# Patient Record
Sex: MALE | Race: BLACK OR AFRICAN AMERICAN | NOT HISPANIC OR LATINO | Employment: OTHER | URBAN - METROPOLITAN AREA
[De-identification: names, ages, dates, MRNs, and addresses within clinical notes are randomized per-mention and may not be internally consistent; named-entity substitution may affect disease eponyms.]

---

## 2019-12-29 ENCOUNTER — HOSPITAL ENCOUNTER (EMERGENCY)
Facility: HOSPITAL | Age: 39
Discharge: HOME | End: 2019-12-29
Payer: COMMERCIAL

## 2019-12-29 VITALS
RESPIRATION RATE: 20 BRPM | HEART RATE: 44 BPM | SYSTOLIC BLOOD PRESSURE: 128 MMHG | BODY MASS INDEX: 25.77 KG/M2 | TEMPERATURE: 98 F | HEIGHT: 70 IN | WEIGHT: 180 LBS | DIASTOLIC BLOOD PRESSURE: 74 MMHG | OXYGEN SATURATION: 100 %

## 2019-12-29 DIAGNOSIS — N50.812 LEFT TESTICULAR PAIN: Primary | ICD-10-CM

## 2019-12-29 LAB
BILIRUB UR QL STRIP: NEGATIVE
CLARITY UR: CLEAR
COLOR UR: YELLOW
GLUCOSE UR QL STRIP: NEGATIVE
HGB UR QL STRIP: NEGATIVE
KETONES UR QL STRIP: NEGATIVE
LEUKOCYTE ESTERASE UR QL STRIP: NEGATIVE
NITRITE UR QL STRIP: NEGATIVE
PH UR STRIP: 7 [PH] (ref 5–8)
PROT UR QL STRIP: NEGATIVE
SP GR UR STRIP: 1.01 (ref 1–1.03)
URN SPEC COLLECT METH UR: NORMAL
UROBILINOGEN UR STRIP-ACNC: NEGATIVE EU/DL

## 2019-12-29 PROCEDURE — 99284 EMERGENCY DEPT VISIT MOD MDM: CPT | Mod: 25

## 2019-12-29 PROCEDURE — 81003 URINALYSIS AUTO W/O SCOPE: CPT

## 2019-12-29 PROCEDURE — 87491 CHLMYD TRACH DNA AMP PROBE: CPT

## 2019-12-29 RX ORDER — NAPROXEN 500 MG/1
500 TABLET ORAL
COMMUNITY
End: 2020-02-19 | Stop reason: SDUPTHER

## 2019-12-29 NOTE — ED PROVIDER NOTES
Encounter Date: 12/29/2019    SCRIBE #1 NOTE: I, Leona Norman, am scribing for, and in the presence of,  Haley Estrada PA-C . I have scribed the following portions of the note - Other sections scribed: HPI, ROS.       History     Chief Complaint   Patient presents with    Groin Swelling     pt has left testical pain and swelling. was sent from  for further eval with ultrasound.      CC: Testicular pain    HPI:  This is a 39 y.o. male with no pertinent PMHx who presents to the Emergency Department complaining of left testicular pain that began this morning after the pt unloaded his truck. Pt states that the left testicle was swollen this morning but improved after taking Naproxen. Pt reports an associated symptom of suprapubic/pelvic pain. He denies rash, penile pain, penile discharge, dysuria, abdominal pain, nausea, vomiting, or diarrhea, or constipation. Pain worsens with palpation. Pt did not express concern about STD. Pt has a hx of UTI. No PSHx.    The history is provided by the patient.     Review of patient's allergies indicates:  No Known Allergies  History reviewed. No pertinent past medical history.  History reviewed. No pertinent surgical history.  History reviewed. No pertinent family history.  Social History     Tobacco Use    Smoking status: Never Smoker   Substance Use Topics    Alcohol use: Yes     Frequency: Never     Comment: occ    Drug use: Never     Review of Systems   Constitutional: Negative for fever.   HENT: Negative for sore throat.    Respiratory: Negative for shortness of breath.    Cardiovascular: Negative for chest pain.   Gastrointestinal: Negative for abdominal pain, constipation, diarrhea, nausea and vomiting.   Genitourinary: Positive for testicular pain. Negative for discharge, dysuria, hematuria and penile pain.        Positive for pelvic pain   Musculoskeletal: Negative for back pain.   Skin: Negative for rash.   Neurological: Negative for weakness.   Hematological: Does not  bruise/bleed easily.       Physical Exam     Initial Vitals [12/29/19 1232]   BP Pulse Resp Temp SpO2   (!) 140/84 60 18 98.2 °F (36.8 °C) 100 %      MAP       --         Physical Exam    Constitutional: He appears well-developed and well-nourished. He is not diaphoretic. No distress.   HENT:   Head: Normocephalic and atraumatic.   Mouth/Throat: Oropharynx is clear and moist. No oropharyngeal exudate.   Eyes: Conjunctivae and EOM are normal. Pupils are equal, round, and reactive to light.   Neck: Normal range of motion. Neck supple.   Cardiovascular: Normal rate, regular rhythm, normal heart sounds and intact distal pulses.   Pulmonary/Chest: Breath sounds normal. No respiratory distress. He has no wheezes. He has no rales.   Abdominal: Soft. Bowel sounds are normal. There is no tenderness. There is no rebound and no guarding.   Genitourinary: Penis normal. No discharge found.   Genitourinary Comments:  exam chaperoned by RN. Normal penis. Normal scrotum. Mildly tender left testicle without erythema or swelling. No rashes noted. Cremasteric reflex intact. No inguinal hernias palpated.    Musculoskeletal: He exhibits no edema.   Lymphadenopathy:     He has no cervical adenopathy.   Neurological: He is alert.   Skin: Skin is warm and dry.   Psychiatric: He has a normal mood and affect.         ED Course   Procedures  Labs Reviewed   C. TRACHOMATIS/N. GONORRHOEAE BY AMP DNA   URINALYSIS, REFLEX TO URINE CULTURE    Narrative:     Preferred Collection Type->Urine, Clean Catch          Imaging Results          US Scrotum And Testicles (Final result)  Result time 12/29/19 15:10:43    Final result by Tito Wylie MD (12/29/19 15:10:43)                 Impression:      No significant abnormality.      Electronically signed by: Tito Wylie MD  Date:    12/29/2019  Time:    15:10             Narrative:    EXAMINATION:  US SCROTUM AND TESTICLES    CLINICAL HISTORY:  Left testicular pain    TECHNIQUE:  Sonography of  the scrotum and testes.    COMPARISON:  None.    FINDINGS:  Right Testicle:    *Size: 4.2 x 2.9 x 3.2 cm  *Appearance: Normal.  *Flow: Normal arterial and venous flow  *Epididymis: Normal.  *Hydrocele: None.  *Varicocele: None.  .    Left Testicle:    *Size: 4.0 x 2.4 x 2.9 cm  *Appearance: Normal.  *Flow: Normal arterial and venous flow  *Epididymis: Normal.  *Hydrocele: None.  *Varicocele: None.  .    Other findings: None.                                       APC / Resident Notes:   Patient is a 39-year-old  male with no past medical history presents to the ED for urgent evaluation of left testicular pain. Patient reports pain began 1 week ago after loading a heavy object into his truck.  He states pain subsided and then returned today after unloading a different heavy object for work.    PE reveals a nontoxic, afebrile, well-appearing male in no acute distress.  Vital signs are stable. Heart RRR.  Lungs CTAB.  Abdomen soft and nontender.   exam unremarkable. There is no testicular swelling or erythema.  Mild tenderness noted to the left testicle.  No hernia is noted.    Will obtain UA, GC culture, and scrotal ultrasound.  Patient offered NSAID, but declined stating that his pain is tolerable at this time after taking naproxen at home.    Differential diagnosis includes but is not limited to:  Testicular torsion, epididymitis, inguinal hernia, UTI, STD, testicular trauma.    UA without evidence of infection.  Ultrasound without significant abnormality.    Upon reassessment, patient states pain remains at a tolerable level.  Unclear cause of testicular pain at this time, though I do not suspect testicular torsion, epididymitis, or UTI this time.  GC pending, although low suspicion for STD.  He was advised to continue naproxen as needed for pain relief as well as ice compresses.  He was given contact information for Urology if symptoms persist.  ED return precautions given.  Patient verbalized  understanding and is agreeable.       Scribe Attestation:   Scribe #1: I performed the above scribed service and the documentation accurately describes the services I performed. I attest to the accuracy of the note.                          Clinical Impression:       ICD-10-CM ICD-9-CM   1. Left testicular pain N50.812 608.9         Disposition:   Disposition: Discharged  Condition: Stable    Scribe attestation: I, Haley Awad, personally performed the services described in this documentation. All medical record entries made by the scribe were at my direction and in my presence.  I have reviewed the chart and agree that the record reflects my personal performance and is accurate and complete.                 Haley Awad PA-C  12/29/19 5380

## 2019-12-29 NOTE — ED TRIAGE NOTES
Pt c/o LEFT testicle pain/swelling, aching pelvic pain. Denies trauma. Symptoms started last weekend, went away then came back this AM after heavy lifting. Denies penile discharge, dysuria, N/V/D. Pain is 8/10. Pt took naproxen at 1100

## 2019-12-29 NOTE — ED NOTES
Informed provider that patient's pulse is 44 currently, no signs or symptoms of low pulse.  Patient states he is an avid runner and does a lot of cardio.  Provider states the low pulse is normal for active people.

## 2019-12-29 NOTE — DISCHARGE INSTRUCTIONS
To relieve pain and swelling, apply an ice pack wrapped in a thin towel for 10 minutes at a time. Continue this on and off for 1 to 2 days.  Continue Naproxen as directed as needed for pain relief.  Follow-up with Urology if symptoms do not improve.  Return to the ED for any concerning symptoms.    Our goal in the emergency department is to always give you outstanding care and exceptional service. You may receive a survey by mail or e-mail in the next week regarding your experience in our ED. We would greatly appreciate your completing and returning the survey. Your feedback provides us with a way to recognize our staff who give very good care and it helps us learn how to improve when your experience was below our aspiration of excellence.

## 2019-12-30 LAB
C TRACH DNA SPEC QL NAA+PROBE: NOT DETECTED
N GONORRHOEA DNA SPEC QL NAA+PROBE: NOT DETECTED

## 2020-02-19 ENCOUNTER — OFFICE VISIT (OUTPATIENT)
Dept: UROLOGY | Facility: CLINIC | Age: 40
End: 2020-02-19
Payer: COMMERCIAL

## 2020-02-19 ENCOUNTER — LAB VISIT (OUTPATIENT)
Dept: LAB | Facility: HOSPITAL | Age: 40
End: 2020-02-19
Attending: UROLOGY
Payer: COMMERCIAL

## 2020-02-19 VITALS — BODY MASS INDEX: 26.33 KG/M2 | WEIGHT: 183.88 LBS | HEIGHT: 70 IN

## 2020-02-19 DIAGNOSIS — N40.0 BPH WITHOUT OBSTRUCTION/LOWER URINARY TRACT SYMPTOMS: ICD-10-CM

## 2020-02-19 DIAGNOSIS — R10.9 FLANK PAIN: ICD-10-CM

## 2020-02-19 DIAGNOSIS — N50.819 ORCHALGIA: Primary | ICD-10-CM

## 2020-02-19 DIAGNOSIS — R10.2 PELVIC PAIN: ICD-10-CM

## 2020-02-19 LAB
ANION GAP SERPL CALC-SCNC: 4 MMOL/L (ref 8–16)
BILIRUB SERPL-MCNC: NORMAL MG/DL
BLOOD URINE, POC: NORMAL
BUN SERPL-MCNC: 15 MG/DL (ref 6–20)
CALCIUM SERPL-MCNC: 9.6 MG/DL (ref 8.7–10.5)
CHLORIDE SERPL-SCNC: 104 MMOL/L (ref 95–110)
CO2 SERPL-SCNC: 31 MMOL/L (ref 23–29)
COLOR, POC UA: YELLOW
CREAT SERPL-MCNC: 1.3 MG/DL (ref 0.5–1.4)
EST. GFR  (AFRICAN AMERICAN): >60 ML/MIN/1.73 M^2
EST. GFR  (NON AFRICAN AMERICAN): >60 ML/MIN/1.73 M^2
GLUCOSE SERPL-MCNC: 89 MG/DL (ref 70–110)
GLUCOSE UR QL STRIP: NORMAL
KETONES UR QL STRIP: NORMAL
LEUKOCYTE ESTERASE URINE, POC: NORMAL
NITRITE, POC UA: NORMAL
PH, POC UA: 6
POTASSIUM SERPL-SCNC: 4.5 MMOL/L (ref 3.5–5.1)
PROTEIN, POC: NORMAL
SODIUM SERPL-SCNC: 139 MMOL/L (ref 136–145)
SPECIFIC GRAVITY, POC UA: 1000
UROBILINOGEN, POC UA: NORMAL

## 2020-02-19 PROCEDURE — 3008F BODY MASS INDEX DOCD: CPT | Mod: CPTII,S$GLB,, | Performed by: UROLOGY

## 2020-02-19 PROCEDURE — 99999 PR PBB SHADOW E&M-EST. PATIENT-LVL III: CPT | Mod: PBBFAC,,, | Performed by: UROLOGY

## 2020-02-19 PROCEDURE — 99999 PR PBB SHADOW E&M-EST. PATIENT-LVL III: ICD-10-PCS | Mod: PBBFAC,,, | Performed by: UROLOGY

## 2020-02-19 PROCEDURE — 84153 ASSAY OF PSA TOTAL: CPT

## 2020-02-19 PROCEDURE — 3008F PR BODY MASS INDEX (BMI) DOCUMENTED: ICD-10-PCS | Mod: CPTII,S$GLB,, | Performed by: UROLOGY

## 2020-02-19 PROCEDURE — 36415 COLL VENOUS BLD VENIPUNCTURE: CPT

## 2020-02-19 PROCEDURE — 87086 URINE CULTURE/COLONY COUNT: CPT

## 2020-02-19 PROCEDURE — 81001 POCT URINALYSIS, DIPSTICK OR TABLET REAGENT, AUTOMATED, WITH MICROSCOP: ICD-10-PCS | Mod: S$GLB,,, | Performed by: UROLOGY

## 2020-02-19 PROCEDURE — 99204 OFFICE O/P NEW MOD 45 MIN: CPT | Mod: 25,S$GLB,, | Performed by: UROLOGY

## 2020-02-19 PROCEDURE — 99204 PR OFFICE/OUTPT VISIT, NEW, LEVL IV, 45-59 MIN: ICD-10-PCS | Mod: 25,S$GLB,, | Performed by: UROLOGY

## 2020-02-19 PROCEDURE — 80048 BASIC METABOLIC PNL TOTAL CA: CPT

## 2020-02-19 PROCEDURE — 81001 URINALYSIS AUTO W/SCOPE: CPT | Mod: S$GLB,,, | Performed by: UROLOGY

## 2020-02-19 RX ORDER — NAPROXEN 500 MG/1
500 TABLET ORAL 2 TIMES DAILY WITH MEALS
Qty: 60 TABLET | Refills: 1 | Status: SHIPPED | OUTPATIENT
Start: 2020-02-19 | End: 2021-03-02

## 2020-02-19 RX ORDER — TAMSULOSIN HYDROCHLORIDE 0.4 MG/1
0.4 CAPSULE ORAL DAILY
Qty: 30 CAPSULE | Refills: 11 | Status: SHIPPED | OUTPATIENT
Start: 2020-02-19 | End: 2020-03-20

## 2020-02-19 RX ORDER — CYCLOBENZAPRINE HCL 10 MG
10 TABLET ORAL
COMMUNITY
Start: 2020-02-12 | End: 2020-02-22

## 2020-02-19 NOTE — PROGRESS NOTES
Subjective:       Patient ID: Berry Anne is a 39 y.o. male who was referred by Self, Aaareferral    Chief Complaint:   Chief Complaint   Patient presents with    Groin Pain     new pt coming in for rt groin pain an ultrasound was done and pt states he sometimes have urinary frequency    Urinary Frequency       Orchalgia  He has had intermittent orchalgia.  This started in December 2019 on the left.  His most recent episode was two days ago on the right.  He denies trauma or infection.  He denies dysuria, hematuria, or urethral discharge.  He denies lifting any thing abnormal.  He cannot recall an inciting events. His event two days ago started when he was getting out of the shower.  He can take Aleeve with eventual resolution of his symptoms.  He has also noted frequency, pelvic and flank discomfort when he has these episodes.    Benign Prostatic Hyperplasia  He patient reports frequency and nocturia one time a night. He denies straining and weak stream. The patient states symptoms are of moderate severity. Onset of symptoms was several months ago and was gradual in onset.  He has no personal history and no family history of prostate cancer. He reports a history of no complicating symptoms. He denies flank pain, gross hematuria, kidney stones and recurrent UTI.  He is currently taking no prostate medications.        ACTIVE MEDICAL ISSUES:  There is no problem list on file for this patient.      PAST MEDICAL HISTORY  History reviewed. No pertinent past medical history.    PAST SURGICAL HISTORY:  History reviewed. No pertinent surgical history.    SOCIAL HISTORY:  Social History     Tobacco Use    Smoking status: Never Smoker    Smokeless tobacco: Never Used   Substance Use Topics    Alcohol use: Yes     Frequency: Never     Comment: occ    Drug use: Never       FAMILY HISTORY:  Family History   Problem Relation Age of Onset    Diabetes Father     Diabetes Mother        ALLERGIES AND MEDICATIONS: updated  "and reviewed.  Review of patient's allergies indicates:  No Known Allergies  Current Outpatient Medications   Medication Sig    cyclobenzaprine (FLEXERIL) 10 MG tablet Take 10 mg by mouth.    naproxen (NAPROSYN) 500 MG tablet Take 1 tablet (500 mg total) by mouth 2 (two) times daily with meals.    tamsulosin (FLOMAX) 0.4 mg Cap Take 1 capsule (0.4 mg total) by mouth once daily.     No current facility-administered medications for this visit.        Review of Systems   Constitutional: Negative for activity change, fatigue, fever and unexpected weight change.   HENT: Negative for congestion.    Eyes: Negative for redness.   Respiratory: Negative for chest tightness and shortness of breath.    Cardiovascular: Negative for chest pain and leg swelling.   Gastrointestinal: Negative for abdominal pain, constipation, diarrhea, nausea and vomiting.   Genitourinary: Positive for testicular pain. Negative for dysuria, flank pain, frequency, hematuria, penile pain, penile swelling, scrotal swelling and urgency.   Musculoskeletal: Negative for arthralgias and back pain.   Neurological: Negative for dizziness and light-headedness.   Psychiatric/Behavioral: Negative for behavioral problems and confusion. The patient is not nervous/anxious.    All other systems reviewed and are negative.      Objective:      Vitals:    02/19/20 1543   Weight: 83.4 kg (183 lb 13.8 oz)   Height: 5' 10" (1.778 m)     Physical Exam   Nursing note and vitals reviewed.  Constitutional: He is oriented to person, place, and time. He appears well-developed and well-nourished.   HENT:   Head: Normocephalic.   Eyes: Conjunctivae are normal.   Neck: Normal range of motion. Neck supple. No tracheal deviation present. No thyromegaly present.   Cardiovascular: Normal rate and normal heart sounds.    Pulmonary/Chest: Effort normal and breath sounds normal. No respiratory distress. He has no wheezes.   Abdominal: Soft. Bowel sounds are normal. There is no " hepatosplenomegaly. There is no tenderness. There is no rebound and no CVA tenderness. No hernia. Hernia confirmed negative in the right inguinal area and confirmed negative in the left inguinal area.   Genitourinary: Testes normal and penis normal. Right testis shows no mass, no swelling and no tenderness. Cremasteric reflex is not absent on the right side. Left testis shows no mass, no swelling and no tenderness. Cremasteric reflex is not absent on the left side. Circumcised.   Musculoskeletal: Normal range of motion. He exhibits no edema or tenderness.   Lymphadenopathy:     He has no cervical adenopathy. No inguinal adenopathy noted on the right or left side.   Neurological: He is alert and oriented to person, place, and time.   Skin: Skin is warm and dry. No rash noted. No erythema.     Psychiatric: He has a normal mood and affect. His behavior is normal. Judgment and thought content normal.       Urine dipstick shows negative for all components.  Micro exam: negative for WBC's or RBC's.    US Scrotum And Testicles   Order: [id number]   Status:  Final result   Visible to patient:  No (Not Released)   Next appt:  None   Dx:  Left testicular pain   Details     Reading Physician Reading Date Result Priority   Tito Wylie MD 12/29/2019 STAT      Narrative     EXAMINATION:  US SCROTUM AND TESTICLES    CLINICAL HISTORY:  Left testicular pain    TECHNIQUE:  Sonography of the scrotum and testes.    COMPARISON:  None.    FINDINGS:  Right Testicle:    *Size: 4.2 x 2.9 x 3.2 cm  *Appearance: Normal.  *Flow: Normal arterial and venous flow  *Epididymis: Normal.  *Hydrocele: None.  *Varicocele: None.  .    Left Testicle:    *Size: 4.0 x 2.4 x 2.9 cm  *Appearance: Normal.  *Flow: Normal arterial and venous flow  *Epididymis: Normal.  *Hydrocele: None.  *Varicocele: None.  .    Other findings: None.      Impression       No significant abnormality.      Electronically signed by: Tito Wylie MD  Date: 12/29/2019  Time:  15:10            Last Resulted: 12/29/19 15:10               Assessment:       1. Orchalgia    2. BPH without obstruction/lower urinary tract symptoms    3. Pelvic pain    4. Flank pain          Plan:       1. Orchalgia  I asked him to call the office when he is having pain.  I would like to do an exam while he is having pain.    - POCT urinalysis, dipstick or tablet reag  - Urine culture  - CT Renal Stone Study ABD Pelvis WO; Future  - naproxen (NAPROSYN) 500 MG tablet; Take 1 tablet (500 mg total) by mouth 2 (two) times daily with meals.  Dispense: 60 tablet; Refill: 1    2. BPH without obstruction/lower urinary tract symptoms    - tamsulosin (FLOMAX) 0.4 mg Cap; Take 1 capsule (0.4 mg total) by mouth once daily.  Dispense: 30 capsule; Refill: 11  - Prostate Specific Antigen, Diagnostic; Future  - Basic metabolic panel; Future    3. Pelvic pain    - CT Renal Stone Study ABD Pelvis WO; Future    4. Flank pain    - CT Renal Stone Study ABD Pelvis WO; Future            Follow up in about 4 weeks (around 3/18/2020) for Follow up, Review X-ray, Review labs, Review PSA.

## 2020-02-20 LAB — COMPLEXED PSA SERPL-MCNC: 1.9 NG/ML (ref 0–4)

## 2020-02-21 ENCOUNTER — TELEPHONE (OUTPATIENT)
Dept: UROLOGY | Facility: CLINIC | Age: 40
End: 2020-02-21

## 2020-02-21 NOTE — TELEPHONE ENCOUNTER
----- Message from Anil Thayer sent at 2/21/2020  3:46 PM CST -----  Contact: SUKHWINDER Logan  Type: Patient Call Back    Who called: Doreen    What is the request in detail: She is requesting CT orders sent to [facility] on behalf of pt. Please advise.    Can the clinic reply by [facility]?No    Would the patient rather a call back or a response via [facility]? Call    Best call back number:[phone] option 2 then option 3    Additional Information:ref: [id number]

## 2020-02-22 LAB — BACTERIA UR CULT: NORMAL

## 2020-02-27 ENCOUNTER — TELEPHONE (OUTPATIENT)
Dept: UROLOGY | Facility: CLINIC | Age: 40
End: 2020-02-27

## 2020-02-27 DIAGNOSIS — R10.9 FLANK PAIN: Primary | ICD-10-CM

## 2020-02-27 NOTE — TELEPHONE ENCOUNTER
----- Message from Shelley Camacho MA sent at 2/26/2020  9:47 AM CST -----  Contact: Self/  [phone]   The pt. Would like an external order placed for his CT .. He would like to go to [facility]..  ----- Message -----  From: Marti Jade  Sent: 2/26/2020   9:34 AM CST  To: Brenna Mack Staff    Type: Patient Call Back    Who called:  Patient    What is the request in detail:  Patient would like to have CT Scan orders sent to [facility].  Patient would like staff to give him a call once orders are placed.   Thank you.    Would the patient rather a call back or a response via [facility]?   Call back    Best call back number:   [phone]

## 2020-02-27 NOTE — TELEPHONE ENCOUNTER
Pt. Would like to have CT scheduled for next week Wednesday .. I let him know I will include his date and phone number so they can reach out to the patient ..

## 2024-10-17 ENCOUNTER — OCCUPATIONAL HEALTH (OUTPATIENT)
Dept: URGENT CARE | Facility: CLINIC | Age: 44
End: 2024-10-17

## 2024-10-17 DIAGNOSIS — Z02.83 ENCOUNTER FOR DRUG SCREENING: Primary | ICD-10-CM

## 2024-10-17 LAB
CTP QC/QA: YES
POC 10 PANEL DRUG SCREEN: NEGATIVE